# Patient Record
Sex: FEMALE | Race: ASIAN | Employment: UNEMPLOYED | URBAN - METROPOLITAN AREA
[De-identification: names, ages, dates, MRNs, and addresses within clinical notes are randomized per-mention and may not be internally consistent; named-entity substitution may affect disease eponyms.]

---

## 2022-06-06 ENCOUNTER — HOSPITAL ENCOUNTER (EMERGENCY)
Age: 67
Discharge: HOME | End: 2022-06-06
Attending: EMERGENCY MEDICINE
Payer: MEDICARE

## 2022-06-06 VITALS
DIASTOLIC BLOOD PRESSURE: 81 MMHG | TEMPERATURE: 98 F | RESPIRATION RATE: 16 BRPM | OXYGEN SATURATION: 96 % | HEART RATE: 84 BPM | SYSTOLIC BLOOD PRESSURE: 159 MMHG

## 2022-06-06 DIAGNOSIS — Z76.0 ENCOUNTER FOR MEDICATION REFILL: Primary | ICD-10-CM

## 2022-06-06 PROCEDURE — 99283 EMERGENCY DEPT VISIT LOW MDM: CPT

## 2022-06-06 RX ORDER — ZOLPIDEM TARTRATE 5 MG/1
5 TABLET ORAL NIGHTLY PRN
Qty: 7 TABLET | Refills: 0 | Status: SHIPPED | OUTPATIENT
Start: 2022-06-06 | End: 2022-06-13 | Stop reason: ALTCHOICE

## 2022-06-06 ASSESSMENT — PAIN - FUNCTIONAL ASSESSMENT: PAIN_FUNCTIONAL_ASSESSMENT: NONE - DENIES PAIN

## 2022-06-06 NOTE — ED PROVIDER NOTES
[address] [facility]          ATTENDING PHYSICIAN NOTE       Date of evaluation: 6/6/2022    Chief Complaint     Medication Refill (needs Nhi Demarco script per pt)      History of Present Illness     Chelsea Giles is a 77 y.o. female who presents with a chief complaint of medication refill. Patient is from [place], she speaks some English but her son is with her who speaks better [place]. States that she recently came over from [place], ran out of her Ambien prescription, has a Medicare card but does not have a primary care doctor yet. She has been taking 40 mg of melatonin nightly and is still unable to sleep, requesting her Ambien prescription. Review of Systems     Review of Systems a complete review of systems was obtained and is negative unless stated otherwise in HPI above. Past Medical, Surgical, Family, and Social History     She has no past medical history on file. She has no past surgical history on file. Her family history is not on file. She reports that she has never smoked. She has never used smokeless tobacco. She reports previous alcohol use. Medications     Previous Medications    No medications on file       Allergies     She has No Known Allergies. Physical Exam     INITIAL VITALS: BP: (!) 159/81, Temp: 98 °F (36.7 °C), Heart Rate: 84, Resp: 16, SpO2: 96 %   Physical Exam  Constitutional:       Appearance: Normal appearance. HENT:      Head: Normocephalic and atraumatic. Nose: Nose normal.      Mouth/Throat:      Mouth: Mucous membranes are moist.   Eyes:      Extraocular Movements: Extraocular movements intact. Pupils: Pupils are equal, round, and reactive to light. Cardiovascular:      Rate and Rhythm: Normal rate. Pulses: Normal pulses. Pulmonary:      Effort: Pulmonary effort is normal. No respiratory distress. Musculoskeletal:         General: No swelling or deformity. Normal range of motion.       Cervical back: Normal range of motion and neck supple. Skin:     General: Skin is warm and dry. Capillary Refill: Capillary refill takes less than 2 seconds. Neurological:      General: No focal deficit present. Mental Status: She is alert and oriented to person, place, and time. Diagnostic Results     EKG   none    RADIOLOGY:  No orders to display       LABS:   No results found for this visit on 06/06/22. ED BEDSIDE ULTRASOUND:  none    RECENT VITALS:  BP: (!) 159/81,Temp: 98 °F (36.7 °C), Heart Rate: 84, Resp: 16, SpO2: 96 %     Procedures     none    ED Course     Nursing Notes, Past Medical Hx, Past Surgical Hx, Social Hx,Allergies, and Family Hx were reviewed. patient was given the following medications:  Orders Placed This Encounter   Medications    zolpidem (AMBIEN) 5 MG tablet     Sig: Take 1 tablet by mouth nightly as needed for Sleep for up to 7 days. Dispense:  7 tablet     Refill:  0       CONSULTS:  None    MEDICAL DECISIONMAKING / ASSESSMENT / PLAN     Yaima Maria is a 77 y.o. female who presents with chief complaint of medication refill. Initial exam reveals a well-appearing female in no acute distress with mild hypertension but otherwise normal vitals, afebrile. Physical exam remarkable for normal exam.  Despite patient stating she has not slept for several days, she looks very well. I explained to her that usually from the emergency department we do not refill controlled medications such as Ambien. I did give her a 7-day supply as well as the phone number for the [address] [place] [address] resident clinic to establish a primary care doctor. Patient discharged home in good condition. .      Clinical Impression     1.  Encounter for medication refill        Disposition     PATIENT REFERRED TO:  [facility] [address] [id number]  [phone]    In 1 week  for ED follow up visit      DISCHARGE MEDICATIONS:  New Prescriptions    ZOLPIDEM (AMBIEN) 5 MG TABLET    Take 1 tablet by mouth nightly as needed for Sleep for up to 7 days.        DISPOSITION Decision To Discharge 06/06/2022 03:13:55 PM       Paula Tobin MD  06/06/22 9940

## 2022-06-06 NOTE — ED NOTES
Pt given dc instructions, verbalized understanding. Pt educated on side effects of new prescriptions and how to take, verbalized understanding. Pt has no further questions or concerns at this time. Pt in no signs of distress. Pt has steady gait to mehrdad.         Yoly Georges RN  06/06/22 1692

## 2022-06-13 ENCOUNTER — OFFICE VISIT (OUTPATIENT)
Dept: PRIMARY CARE CLINIC | Age: 67
End: 2022-06-13
Payer: MEDICARE

## 2022-06-13 VITALS
HEART RATE: 72 BPM | DIASTOLIC BLOOD PRESSURE: 84 MMHG | SYSTOLIC BLOOD PRESSURE: 136 MMHG | OXYGEN SATURATION: 96 % | TEMPERATURE: 96.9 F | WEIGHT: 153 LBS

## 2022-06-13 DIAGNOSIS — Z11.59 NEED FOR HEPATITIS C SCREENING TEST: ICD-10-CM

## 2022-06-13 DIAGNOSIS — G47.00 INSOMNIA, UNSPECIFIED TYPE: ICD-10-CM

## 2022-06-13 DIAGNOSIS — Z12.11 SCREEN FOR COLON CANCER: ICD-10-CM

## 2022-06-13 DIAGNOSIS — Z11.4 ENCOUNTER FOR SCREENING FOR HIV: ICD-10-CM

## 2022-06-13 DIAGNOSIS — Z12.31 ENCOUNTER FOR SCREENING MAMMOGRAM FOR MALIGNANT NEOPLASM OF BREAST: ICD-10-CM

## 2022-06-13 DIAGNOSIS — Z76.89 ENCOUNTER TO ESTABLISH CARE: Primary | ICD-10-CM

## 2022-06-13 PROCEDURE — 99203 OFFICE O/P NEW LOW 30 MIN: CPT

## 2022-06-13 PROCEDURE — 1123F ACP DISCUSS/DSCN MKR DOCD: CPT

## 2022-06-13 RX ORDER — ZOLPIDEM TARTRATE 5 MG/1
5 TABLET ORAL NIGHTLY PRN
Qty: 30 TABLET | Refills: 2 | Status: SHIPPED | OUTPATIENT
Start: 2022-06-13 | End: 2022-09-11

## 2022-06-13 ASSESSMENT — PATIENT HEALTH QUESTIONNAIRE - PHQ9
SUM OF ALL RESPONSES TO PHQ QUESTIONS 1-9: 0
1. LITTLE INTEREST OR PLEASURE IN DOING THINGS: 0
SUM OF ALL RESPONSES TO PHQ QUESTIONS 1-9: 0
SUM OF ALL RESPONSES TO PHQ QUESTIONS 1-9: 0
2. FEELING DOWN, DEPRESSED OR HOPELESS: 0
SUM OF ALL RESPONSES TO PHQ9 QUESTIONS 1 & 2: 0
SUM OF ALL RESPONSES TO PHQ QUESTIONS 1-9: 0

## 2022-06-13 ASSESSMENT — ENCOUNTER SYMPTOMS
DIARRHEA: 0
BLOOD IN STOOL: 0
SHORTNESS OF BREATH: 0
COUGH: 0
ABDOMINAL PAIN: 0
NAUSEA: 0
VOMITING: 0
CONSTIPATION: 0
WHEEZING: 0
CHEST TIGHTNESS: 0

## 2022-06-13 NOTE — PROGRESS NOTES
Dariana Rogers (:  1955) is a 77 y.o. female,New patient, here for evaluation of the following chief complaint(s): Insomnia (Pt was given zolpidem (AMBIEN) 5 MG tablet in the ED. Pt asking for a refill)      HPI  Patient with no medical history aside from insomnia presents to establish care with new provider after visiting ER 2022 for medication refill of ambien. Patient recently moved from [place] and stated that she had run out and was therefore unable to sleep for several days despite taking melatonin. Patient states has been taking Ambien for approx 30 years. Patient was given 7 days supply of medication and instructed to f/u with PCP. Patient due for mammogram - never had, will refer  Patient due for colonoscopy - never had, will refer    ASSESSMENT/PLAN:  1. Encounter to establish care  Assessment & Plan:   Annual labs ordered - will f/u with any abnormal results. Orders:  -     CBC with Auto Differential; Future  -     Lipid, Fasting; Future  -     Comprehensive Metabolic Panel; Future  -     TSH with Reflex; Future  2. Insomnia, unspecified type  Assessment & Plan:  Rx ambien x3 months. Discussed attempting to wean off ambien in future - trying trazodone instead to manage insomnia. Continue proper sleep hygiene. Orders:  -     CBC with Auto Differential; Future  -     Lipid, Fasting; Future  -     Comprehensive Metabolic Panel; Future  -     TSH with Reflex; Future  -     zolpidem (AMBIEN) 5 MG tablet; Take 1 tablet by mouth nightly as needed for Sleep for up to 90 days. , Disp-30 tablet, R-2Normal  3. Encounter for screening mammogram for malignant neoplasm of breast  -     PANDA DIGITAL SCREEN W OR WO CAD BILATERAL; Future  4. Screen for colon cancer  -     [facility] - Juan Diego Vargas MD, Gastroenterology, [facility]  5. Encounter for screening for HIV  -     HIV Screen; Future  6. Need for hepatitis C screening test  -     Hepatitis C Antibody;  Future       /84   Pulse 72   Temp 96.9 °F (36.1 °C) (Infrared)   Wt 153 lb (69.4 kg)   SpO2 96%    VSS    SUBJECTIVE/OBJECTIVE:  Review of Systems   Constitutional: Negative for fatigue, fever and unexpected weight change. Respiratory: Negative for cough, chest tightness, shortness of breath and wheezing. Cardiovascular: Negative for chest pain, palpitations and leg swelling. Gastrointestinal: Negative for abdominal pain, blood in stool, constipation, diarrhea, nausea and vomiting. Neurological: Negative for dizziness, weakness, light-headedness and headaches. Psychiatric/Behavioral: Positive for sleep disturbance (x30 years). The patient is not nervous/anxious. Physical Exam  Vitals and nursing note reviewed. Constitutional:       General: She is not in acute distress. Appearance: Normal appearance. She is normal weight. She is not ill-appearing. Cardiovascular:      Rate and Rhythm: Normal rate and regular rhythm. Pulses: Normal pulses. Heart sounds: Normal heart sounds. Pulmonary:      Effort: Pulmonary effort is normal.      Breath sounds: Normal breath sounds. Musculoskeletal:         General: Normal range of motion. Cervical back: Normal range of motion. Skin:     General: Skin is warm and dry. Capillary Refill: Capillary refill takes less than 2 seconds. Neurological:      Mental Status: She is alert and oriented to person, place, and time. Mental status is at baseline. Psychiatric:         Mood and Affect: Mood normal.         Behavior: Behavior normal.         Thought Content: Thought content normal.         Judgment: Judgment normal.         Current Outpatient Medications   Medication Sig Dispense Refill    zolpidem (AMBIEN) 5 MG tablet Take 1 tablet by mouth nightly as needed for Sleep for up to 90 days. 30 tablet 2     No current facility-administered medications for this visit. Return in about 4 weeks (around 7/11/2022) for AWV.     Electronically signed by Pio Spring APRN - CNP on 6/13/2022 at 4:18 PM

## 2022-06-13 NOTE — PATIENT INSTRUCTIONS
Patient Education        Insomnia: Care Instructions  Overview     Insomnia is the inability to sleep well. Insomnia may make it hard for you to get to sleep, stay asleep, or sleep as long as you need to. This can make you tired and grouchy during the day. It can also make you forgetful, lesseffective at work, and unhappy. Insomnia can be linked to many things. These include health problems,medicines, and stressful events. Treatment may include treating problems that may be linked with your insomnia. Treatment also includes behavior and lifestyle changes. This may include cognitive-behavioral therapy for insomnia (CBT-I). CBT-I uses different ways to help you change your thoughts and behaviors that may interfere with sleep. Your doctor can recommend specific things you can try. Examples include doing relaxation exercises, keeping regular bedtimes and wake times, limiting alcohol, and making healthy sleep habits. Some people decide to take medicinefor a while to help with sleep. Follow-up care is a key part of your treatment and safety. Be sure to make and go to all appointments, and call your doctor if you are having problems. It's also a good idea to know your test results and keep alist of the medicines you take. How can you care for yourself at home? Cognitive-behavioral therapy for insomnia (CBT-I)   If your doctor recommends CBT-I, follow your treatment plan. Your doctor will give you instructions that are unique for you.  Your plan will likely include a few things that you can try at home. For example:  ? Try meditation or other relaxation techniques before you go to bed. ? Go to bed at the same time every night, and wake up at the same time every morning. Do not take naps during the day. ? Do not stay in bed awake for too long.  If you can't fall asleep, or if you wake up in the middle of the night and can't get back to sleep within about 15 to 20 minutes, get out of bed and go to another room until you feel sleepy. ? If watching the clock makes you anxious, turn it facing away from you so you cannot see the time. ? If you worry when you lie down, start a worry book. Well before bedtime, write down your worries, and then set the book and your concerns aside. Healthy sleep habits   If your doctor recommends it, try making healthy sleep habits. For example:  ? Keep your bedroom quiet, dark, and cool. ? Do not have drinks with caffeine, such as coffee or black tea, for 8 hours before bed. ? Do not smoke or use other types of tobacco near bedtime. Nicotine is a stimulant and can keep you awake. ? Avoid drinking alcohol late in the evening, because it can cause you to wake in the middle of the night. ? Do not eat a big meal close to bedtime. If you are hungry, eat a light snack. ? Do not drink a lot of water close to bedtime, because the need to urinate may wake you up during the night. ? Do not read, watch TV, or use your phone in bed. Use the bed only for sleeping and sex. Medicine   Be safe with medicines. Take your medicines exactly as prescribed. Call your doctor if you think you are having a problem with your medicine.  Talk with your doctor before you try an over-the-counter medicine, herbal product, or supplement to try to improve your sleep. Your doctor can recommend how much to take and when to take it. Make sure your doctor knows all of the medicines, vitamins, herbal products, and supplements you take.  You will get more details on the specific medicines your doctor prescribes. When should you call for help? Watch closely for changes in your health, and be sure to contact your doctor if:     Your efforts to improve your sleep do not work.      Your insomnia gets worse.      You have been feeling down, depressed, or hopeless or have lost interest in things that you usually enjoy. Where can you learn more? Go to [email or web address] and sign in to your MyChart account. Enter P513 in the [place] box to learn more about \"Insomnia: Care Instructions. \"     If you do not have an account, please click on the \"Sign Up Now\" link. Current as of: June 16, 2021               Content Version: 13.2  © 0961-0222 [employer]. Care instructions adapted under license by [facility] ([facility]). If you have questions about a medical condition or this instruction, always ask your healthcare professional. [address] any warranty or liability for your use of this information. Patient Education        ???? ???? Insomnia: Care Instructions  ????  ?????????????? ?????????????? ????????????????????? ??????????????????????????????  ????????????????????? ??????????? ?????????????????? ??????????????????  ???????????????????????????????  ???????????????? ?????????????????????????????????????? ???????????????????????????  ?????????  ??????    ????? 8 ?????????????????????   ??????????????????????? ??????????????????   ????????????????????   ?????????????? ???????????????   ??????????????????????????????????   ???????????? ????????????  ??????    ?????????????? ???????   ?????????????   ???????????????????? 3 ? 4 ??????   ???????????   ??????????????????????????????   ???????????????????? ???????????????????????????????   ???????????????   ???????????????????????? ?????????? ??????????????   ??????????????????? ??????????????????????? ?????????????  ????????  ??????????????????????????????   ????????????   ??????   ????????????????????????  ????????????  ??   [email or web address]  ??????? P513???????? \"???? ????.\"  ????: [id number]?               ????: 13.2  © 5223-5111 [employer]?   ???????????????? ??????? ?????????????????????????????  [employer]?????????????????????

## 2022-06-13 NOTE — ASSESSMENT & PLAN NOTE
Rx ambien x3 months. Discussed attempting to wean off ambien in future - trying trazodone instead to manage insomnia. Continue proper sleep hygiene.

## 2022-10-12 ENCOUNTER — TELEPHONE (OUTPATIENT)
Dept: PRIMARY CARE CLINIC | Age: 67
End: 2022-10-12

## 2022-10-13 DIAGNOSIS — G47.00 INSOMNIA, UNSPECIFIED TYPE: Primary | ICD-10-CM

## 2022-10-13 RX ORDER — TRAZODONE HYDROCHLORIDE 50 MG/1
50 TABLET ORAL NIGHTLY
Qty: 30 TABLET | Refills: 0 | Status: SHIPPED | OUTPATIENT
Start: 2022-10-13 | End: 2022-11-12

## 2022-10-13 NOTE — PROGRESS NOTES
Pt requesting refill of Ambien 5mg for insomnia. At previous visit, patient was encouraged to decrease use of Ambien as much as possible to start weaning off medication with hopes to switch to Trazodone for management. Patient has yet to complete labs from prior OV - asked for these to be completed prior to any refills of Ambien, one month supply of Trazodone provided this date.

## 2022-12-29 DIAGNOSIS — G47.00 INSOMNIA, UNSPECIFIED TYPE: ICD-10-CM

## 2022-12-29 RX ORDER — ZOLPIDEM TARTRATE 5 MG/1
5 TABLET ORAL NIGHTLY PRN
Qty: 30 TABLET | Refills: 0 | Status: SHIPPED | OUTPATIENT
Start: 2022-12-29 | End: 2023-03-29

## 2022-12-29 NOTE — TELEPHONE ENCOUNTER
----- Message from [facility] sent at 12/29/2022  2:38 PM EST -----  Subject: Refill Request    QUESTIONS  Name of Medication? zolpidem (AMBIEN) 5 MG tablet  Patient-reported dosage and instructions? take a night  How many days do you have left? 2  Preferred Pharmacy? [facility] [id number]  Pharmacy phone number (if available)? [phone]  ---------------------------------------------------------------------------  --------------  Jeffrey LOAIZA  What is the best way for the office to contact you? OK to leave message on   voicemail  Preferred Call Back Phone Number? [phone]  ---------------------------------------------------------------------------  --------------  SCRIPT ANSWERS  Relationship to Patient?  Self

## 2023-03-02 DIAGNOSIS — G47.00 INSOMNIA, UNSPECIFIED TYPE: ICD-10-CM

## 2023-03-02 NOTE — TELEPHONE ENCOUNTER
Recent Visits  Date Type Provider Dept   06/13/22 Office Visit EULALIO Garcia - CNP [facility] [address] recent visits within past 540 days with a meds authorizing provider and meeting all other requirements  Future Appointments  No visits were found meeting these conditions.   Showing future appointments within next 150 days with a meds authorizing provider and meeting all other requirements yes

## 2023-03-02 NOTE — TELEPHONE ENCOUNTER
Pt is requesting a refill on this medication  zolpidem (AMBIEN) 5 MG tablet    [facility]

## 2023-03-03 NOTE — TELEPHONE ENCOUNTER
Pt is calling again for her medication pt keeps stating I called yesterday and did not get anything pt is not understanding anything Im trying to tell her ended up using  also changed in her chart that she needs a  informed pt I would let Fabián [place] know she is calling again in regards of her medication pt states she really needs her medication and she can not sleep well pt states don't know why its been so hard to get her medication

## 2023-03-06 RX ORDER — ZOLPIDEM TARTRATE 5 MG/1
5 TABLET ORAL NIGHTLY PRN
Qty: 30 TABLET | Refills: 0 | Status: SHIPPED | OUTPATIENT
Start: 2023-03-06 | End: 2023-06-04

## 2023-03-06 NOTE — TELEPHONE ENCOUNTER
Recent Visits  Date Type Provider Dept   06/13/22 Office Visit Evonne Workman, APRN - CNP [facility]   Showing recent visits within past 540 days with a meds authorizing provider and meeting all other requirements  Future Appointments  No visits were found meeting these conditions.  Showing future appointments within next 150 days with a meds authorizing provider and meeting all other requirements

## 2023-04-12 DIAGNOSIS — G47.00 INSOMNIA, UNSPECIFIED TYPE: ICD-10-CM

## 2023-04-13 RX ORDER — ZOLPIDEM TARTRATE 5 MG/1
5 TABLET ORAL NIGHTLY PRN
Qty: 30 TABLET | Refills: 0 | OUTPATIENT
Start: 2023-04-13 | End: 2023-07-12

## 2023-04-14 DIAGNOSIS — G47.00 INSOMNIA, UNSPECIFIED TYPE: ICD-10-CM

## 2023-04-17 RX ORDER — ZOLPIDEM TARTRATE 5 MG/1
5 TABLET ORAL NIGHTLY PRN
Qty: 30 TABLET | Refills: 0 | Status: SHIPPED | OUTPATIENT
Start: 2023-04-17 | End: 2023-07-16

## 2023-04-17 NOTE — TELEPHONE ENCOUNTER
Patient needs an appointment by June for any further refills. She also needs to complete labs which were ordered last June prior to any further refills.

## 2023-04-17 NOTE — TELEPHONE ENCOUNTER
Samson Jara [id number]   Patient states she does not use [facility] and hung up. Will try again later.

## 2023-06-01 ENCOUNTER — TELEPHONE (OUTPATIENT)
Dept: PRIMARY CARE CLINIC | Age: 68
End: 2023-06-01

## 2023-06-01 DIAGNOSIS — G47.00 INSOMNIA, UNSPECIFIED TYPE: ICD-10-CM

## 2023-06-01 NOTE — CONSULTS
Session ID: [id number]  Request ID: [id number]  Language: Lashaun Mckeon  Status: Fulfilled   ID: #[id number]   Name: [facility]

## 2023-06-01 NOTE — TELEPHONE ENCOUNTER
Refill on this medication traZODone (DESYREL) 50 MG tablet  [facility] [id number] - [place], [place] - [address]   Please advise